# Patient Record
Sex: FEMALE | Race: WHITE | Employment: FULL TIME | URBAN - METROPOLITAN AREA
[De-identification: names, ages, dates, MRNs, and addresses within clinical notes are randomized per-mention and may not be internally consistent; named-entity substitution may affect disease eponyms.]

---

## 2017-01-05 ENCOUNTER — HOSPITAL ENCOUNTER (EMERGENCY)
Age: 22
Discharge: HOME | End: 2017-01-05
Attending: EMERGENCY MEDICINE
Payer: COMMERCIAL

## 2017-01-05 ENCOUNTER — APPOINTMENT (OUTPATIENT)
Dept: ULTRASOUND IMAGING | Age: 22
End: 2017-01-05
Attending: PHYSICIAN ASSISTANT
Payer: COMMERCIAL

## 2017-01-05 VITALS
SYSTOLIC BLOOD PRESSURE: 116 MMHG | TEMPERATURE: 97.6 F | OXYGEN SATURATION: 99 % | HEIGHT: 66 IN | WEIGHT: 140 LBS | DIASTOLIC BLOOD PRESSURE: 69 MMHG | HEART RATE: 84 BPM | RESPIRATION RATE: 16 BRPM | BODY MASS INDEX: 22.5 KG/M2

## 2017-01-05 DIAGNOSIS — N93.9 ABNORMAL UTERINE BLEEDING (AUB): Primary | ICD-10-CM

## 2017-01-05 LAB
ABO + RH BLD: NORMAL
ALBUMIN SERPL BCP-MCNC: 4.1 G/DL (ref 3.4–5)
ALBUMIN/GLOB SERPL: 1.2 {RATIO} (ref 0.8–1.7)
ALP SERPL-CCNC: 72 U/L (ref 45–117)
ALT SERPL-CCNC: 20 U/L (ref 13–56)
ANION GAP BLD CALC-SCNC: 4 MMOL/L (ref 3–18)
APPEARANCE UR: ABNORMAL
AST SERPL W P-5'-P-CCNC: 11 U/L (ref 15–37)
BACTERIA URNS QL MICRO: ABNORMAL /HPF
BASOPHILS # BLD AUTO: 0 K/UL (ref 0–0.1)
BASOPHILS # BLD: 0 % (ref 0–2)
BILIRUB SERPL-MCNC: 0.3 MG/DL (ref 0.2–1)
BILIRUB UR QL: NEGATIVE
BUN SERPL-MCNC: 14 MG/DL (ref 7–18)
BUN/CREAT SERPL: 19 (ref 12–20)
CALCIUM SERPL-MCNC: 8.5 MG/DL (ref 8.5–10.1)
CHLORIDE SERPL-SCNC: 106 MMOL/L (ref 100–108)
CO2 SERPL-SCNC: 30 MMOL/L (ref 21–32)
COLOR UR: ABNORMAL
CREAT SERPL-MCNC: 0.75 MG/DL (ref 0.6–1.3)
DIFFERENTIAL METHOD BLD: NORMAL
EOSINOPHIL # BLD: 0.4 K/UL (ref 0–0.4)
EOSINOPHIL NFR BLD: 4 % (ref 0–5)
EPITH CASTS URNS QL MICRO: ABNORMAL /LPF (ref 0–5)
ERYTHROCYTE [DISTWIDTH] IN BLOOD BY AUTOMATED COUNT: 12.7 % (ref 11.6–14.5)
GLOBULIN SER CALC-MCNC: 3.5 G/DL (ref 2–4)
GLUCOSE SERPL-MCNC: 83 MG/DL (ref 74–99)
GLUCOSE UR STRIP.AUTO-MCNC: NEGATIVE MG/DL
HCG SERPL-ACNC: 2 MIU/ML (ref 0–10)
HCT VFR BLD AUTO: 42.3 % (ref 35–45)
HGB BLD-MCNC: 14.2 G/DL (ref 12–16)
HGB UR QL STRIP: ABNORMAL
KETONES UR QL STRIP.AUTO: ABNORMAL MG/DL
LEUKOCYTE ESTERASE UR QL STRIP.AUTO: NEGATIVE
LYMPHOCYTES # BLD AUTO: 37 % (ref 21–52)
LYMPHOCYTES # BLD: 3.3 K/UL (ref 0.9–3.6)
MCH RBC QN AUTO: 28.1 PG (ref 24–34)
MCHC RBC AUTO-ENTMCNC: 33.6 G/DL (ref 31–37)
MCV RBC AUTO: 83.6 FL (ref 74–97)
MONOCYTES # BLD: 0.4 K/UL (ref 0.05–1.2)
MONOCYTES NFR BLD AUTO: 5 % (ref 3–10)
NEUTS SEG # BLD: 4.8 K/UL (ref 1.8–8)
NEUTS SEG NFR BLD AUTO: 54 % (ref 40–73)
NITRITE UR QL STRIP.AUTO: NEGATIVE
PH UR STRIP: 8.5 [PH] (ref 5–8)
PLATELET # BLD AUTO: 287 K/UL (ref 135–420)
PMV BLD AUTO: 9.9 FL (ref 9.2–11.8)
POTASSIUM SERPL-SCNC: 3.5 MMOL/L (ref 3.5–5.5)
PROT SERPL-MCNC: 7.6 G/DL (ref 6.4–8.2)
PROT UR STRIP-MCNC: 100 MG/DL
RBC # BLD AUTO: 5.06 M/UL (ref 4.2–5.3)
RBC #/AREA URNS HPF: ABNORMAL /HPF (ref 0–5)
SERVICE CMNT-IMP: NORMAL
SODIUM SERPL-SCNC: 140 MMOL/L (ref 136–145)
SP GR UR REFRACTOMETRY: 1.02 (ref 1–1.03)
TSH SERPL DL<=0.05 MIU/L-ACNC: 0.76 UIU/ML (ref 0.36–3.74)
UROBILINOGEN UR QL STRIP.AUTO: 0.2 EU/DL (ref 0.2–1)
WBC # BLD AUTO: 9 K/UL (ref 4.6–13.2)
WET PREP GENITAL: NORMAL

## 2017-01-05 PROCEDURE — 81001 URINALYSIS AUTO W/SCOPE: CPT | Performed by: PHYSICIAN ASSISTANT

## 2017-01-05 PROCEDURE — 87491 CHLMYD TRACH DNA AMP PROBE: CPT | Performed by: PHYSICIAN ASSISTANT

## 2017-01-05 PROCEDURE — 85025 COMPLETE CBC W/AUTO DIFF WBC: CPT | Performed by: PHYSICIAN ASSISTANT

## 2017-01-05 PROCEDURE — 80053 COMPREHEN METABOLIC PANEL: CPT | Performed by: PHYSICIAN ASSISTANT

## 2017-01-05 PROCEDURE — 84702 CHORIONIC GONADOTROPIN TEST: CPT | Performed by: PHYSICIAN ASSISTANT

## 2017-01-05 PROCEDURE — 99283 EMERGENCY DEPT VISIT LOW MDM: CPT

## 2017-01-05 PROCEDURE — 84443 ASSAY THYROID STIM HORMONE: CPT | Performed by: PHYSICIAN ASSISTANT

## 2017-01-05 PROCEDURE — 96374 THER/PROPH/DIAG INJ IV PUSH: CPT

## 2017-01-05 PROCEDURE — 86901 BLOOD TYPING SEROLOGIC RH(D): CPT | Performed by: PHYSICIAN ASSISTANT

## 2017-01-05 PROCEDURE — 74011250636 HC RX REV CODE- 250/636: Performed by: PHYSICIAN ASSISTANT

## 2017-01-05 PROCEDURE — 76830 TRANSVAGINAL US NON-OB: CPT

## 2017-01-05 PROCEDURE — 87210 SMEAR WET MOUNT SALINE/INK: CPT | Performed by: PHYSICIAN ASSISTANT

## 2017-01-05 RX ORDER — MORPHINE SULFATE 4 MG/ML
4 INJECTION, SOLUTION INTRAMUSCULAR; INTRAVENOUS
Status: COMPLETED | OUTPATIENT
Start: 2017-01-05 | End: 2017-01-05

## 2017-01-05 RX ORDER — HYDROCODONE BITARTRATE AND ACETAMINOPHEN 5; 325 MG/1; MG/1
1 TABLET ORAL
Qty: 12 TAB | Refills: 0 | Status: SHIPPED | OUTPATIENT
Start: 2017-01-05 | End: 2020-08-07

## 2017-01-05 RX ADMIN — Medication 4 MG: at 16:22

## 2017-01-05 NOTE — ED TRIAGE NOTES
Took the pill regimen for  x 2 months ago, heavy bleeding with clots x 1 week. States she feels tired all the time. Denies dizziness. states that huge clots came out with bad cramps.

## 2017-01-05 NOTE — ED PROVIDER NOTES
HPI Comments: Stefanie Stone is a 24 y.o. female who presents to the emergency department for evaluation of vaginal bleeding and pelvic cramping intermittent x 2 months since taking cytotec for elective  of 7-8 week gestation. She states she had heavy bleeding after the initial cytotec dose, then bleeding lightened, but did not stop completely. She states she had a normal follow-up visit in clinic for elective  with a normal US. Bleeding became very heavy yesterday and is now causing more pain. No nausea or vomiting. Pt denies possibility of recurrent pregnancy. She relates mild ACOSTA. Pt denies any fevers or chills, dizziness or light headedness, ENT issues, CP or discomfort, SOB, cough, d/c, back pain, diaphoresis, melena/hematochezia, dysuria, hematuria, frequency, focal weakness/numbness/tingling, or rash. Patient has no other complaints at this time. PCP: None        Patient is a 24 y.o. female presenting with  and vaginal bleeding. Elective    Pertinent negatives include no chest pain, no headaches and no shortness of breath. Vaginal Bleeding   Pertinent negatives include no chest pain, no headaches and no shortness of breath. Past Medical History:   Diagnosis Date    UTI (urinary tract infection)        No past surgical history on file. Family History:   Problem Relation Age of Onset    Cancer Other     Heart Disease Other     Diabetes Other        Social History     Social History    Marital status: SINGLE     Spouse name: N/A    Number of children: N/A    Years of education: N/A     Occupational History    Not on file.      Social History Main Topics    Smoking status: Current Every Day Smoker     Packs/day: 1.00    Smokeless tobacco: Not on file    Alcohol use No    Drug use: No    Sexual activity: Not on file     Other Topics Concern    Not on file     Social History Narrative         ALLERGIES: Review of patient's allergies indicates no known allergies. Review of Systems   Constitutional: Negative for chills and fever. HENT: Negative for congestion, rhinorrhea and sore throat. Respiratory: Negative for cough and shortness of breath. Cardiovascular: Negative for chest pain. Gastrointestinal: Negative for blood in stool, constipation, diarrhea, nausea and vomiting. Genitourinary: Positive for pelvic pain and vaginal bleeding. Negative for dysuria, frequency and hematuria. Musculoskeletal: Negative for back pain and myalgias. Skin: Negative for rash and wound. Neurological: Negative for dizziness, weakness, numbness and headaches. Vitals:    01/05/17 1305   BP: 116/69   Pulse: 84   Resp: 16   Temp: 97.6 °F (36.4 °C)   SpO2: 99%   Weight: 63.5 kg (140 lb)   Height: 5' 6\" (1.676 m)            Physical Exam   Constitutional: She is oriented to person, place, and time. She appears well-developed and well-nourished. No distress. HENT:   Head: Normocephalic and atraumatic. Nose: Nose normal.   Mouth/Throat: Oropharynx is clear and moist. No oropharyngeal exudate. Eyes: Conjunctivae and EOM are normal. Pupils are equal, round, and reactive to light. Neck: Normal range of motion. Neck supple. Cardiovascular: Normal rate and intact distal pulses. Exam reveals no gallop and no friction rub. No murmur heard. Pulmonary/Chest: Effort normal and breath sounds normal. No respiratory distress. She has no wheezes. She has no rales. She exhibits no tenderness. Abdominal: Soft. Bowel sounds are normal. She exhibits no distension and no mass. There is tenderness. There is no rebound and no guarding. Suprapubic TTP   Genitourinary:   Genitourinary Comments: Pelvic Exam:    Speculum and bimanual pelvic exam performed at bedside with chaperone assistance of DANIELLE lewis. Normal exam of external labia majora and minora without apparent rash or lesions. There was minimal vaginal bleeding. Cervical os was closed.   No adnexal masses or tenderness appreciated. Patient tolerated procedure well. No CMT. Samples obtained for GC/Chlamydia and wet prep, labeled at bedside and sent to lab.  1:52 PM     Neurological: She is alert and oriented to person, place, and time. Skin: Skin is warm and dry. No rash noted. She is not diaphoretic. Psychiatric: She has a normal mood and affect. Her behavior is normal.   Nursing note and vitals reviewed. MDM  Number of Diagnoses or Management Options  Abnormal uterine bleeding (AUB): new and requires workup  Diagnosis management comments: Differential Diagnosis:  Menses, anovulation/DUB, spontaneous , threatened , pregnancy, uterine leiomyomas, cervical or endometrial polyps, pelvic tumors, atrophic endometrium, systemic disorders, hypothyroidism, pelvic infections, exogenous hormone use, coagulopathy, trauma    Plan: Rh negative, but pt is not pregnant. Hcg qt of 2. Minimal bleeding on exam.  UA negative.  hgb 14.2. TVUS shows complex structure near uterine fundus - retained POC versus molar pregnancy. No leukocytosis. Discussed with OB, who recommends outpatient follow-up next week. No emergent meds here today. Lengthy discussion regarding need for follow-up. Discussed with Dr. Pavan Buckner the (+/-) of rhogam at this point. Do not feel it is necessary given that patient is 2 months out from  with hcg of 2 today. At this time, patient is stable and appropriate for discharge home. Patient demonstrates understanding of current diagnoses and is in agreement with the treatment plan. They are advised that while the likelihood of serious underlying condition is low at this point given the evaluation performed today, we cannot fully rule it out. They are advised to immediately return with any new symptoms or worsening of current condition. All questions have been answered.   Patient is given educational material regarding their diagnoses, including danger symptoms and when to return to the ED. Follow-up with Dr. Conchis Mendoza. Amount and/or Complexity of Data Reviewed  Clinical lab tests: ordered and reviewed  Tests in the radiology section of CPT®: ordered and reviewed  Review and summarize past medical records: yes  Discuss the patient with other providers: yes (Discussed case with Dr. Emily Mchugh)    Risk of Complications, Morbidity, and/or Mortality  Presenting problems: moderate  Diagnostic procedures: high  Management options: moderate    Patient Progress  Patient progress: improved    ED Course       Procedures           -------------------------------------------------------------------------------------------------------------------  Orders:  Orders Placed This Encounter    WET PREP     Standing Status:   Standing     Number of Occurrences:   1    US TRANSVAGINAL     Standing Status:   Standing     Number of Occurrences:   1     Order Specific Question:   Transport     Answer:   Wheelchair [7]     Order Specific Question:   Reason for Exam     Answer:   heavy vaginal bleeding     Order Specific Question:   Is Patient Pregnant?      Answer:   Unknown    URINALYSIS W/ RFLX MICROSCOPIC     Standing Status:   Standing     Number of Occurrences:   1    CBC WITH AUTOMATED DIFF     Standing Status:   Standing     Number of Occurrences:   1    METABOLIC PANEL, COMPREHENSIVE     Standing Status:   Standing     Number of Occurrences:   1    TOTAL HCG, QT.     Standing Status:   Standing     Number of Occurrences:   1    CHLAMYDIA/NEISSERIA AMPLIFICATION     Standing Status:   Standing     Number of Occurrences:   1     Order Specific Question:   Specimen type/source     Answer:   Endocervical [538]    TSH, 3RD GENERATION     Standing Status:   Standing     Number of Occurrences:   1    URINE MICROSCOPIC ONLY     Standing Status:   Standing     Number of Occurrences:   1    TYPE, ABO & RH     Standing Status:   Standing     Number of Occurrences:   1    morphine injection 4 mg    HYDROcodone-acetaminophen (NORCO) 5-325 mg per tablet     Sig: Take 1 Tab by mouth every four (4) hours as needed for Pain. Max Daily Amount: 6 Tabs. Dispense:  12 Tab     Refill:  0     Lab Results:   Recent Results (from the past 12 hour(s))   URINALYSIS W/ RFLX MICROSCOPIC    Collection Time: 01/05/17  1:30 PM   Result Value Ref Range    Color RED      Appearance CLOUDY      Specific gravity 1.020 1.003 - 1.030      pH (UA) 8.5 (H) 5.0 - 8.0      Protein 100 (A) NEG mg/dL    Glucose NEGATIVE  NEG mg/dL    Ketone TRACE (A) NEG mg/dL    Bilirubin NEGATIVE  NEG      Blood LARGE (A) NEG      Urobilinogen 0.2 0.2 - 1.0 EU/dL    Nitrites NEGATIVE  NEG      Leukocyte Esterase NEGATIVE  NEG     URINE MICROSCOPIC ONLY    Collection Time: 01/05/17  1:30 PM   Result Value Ref Range    RBC TOO NUMEROUS TO COUNT 0 - 5 /hpf    Epithelial cells 1+ 0 - 5 /lpf    Bacteria 1+ (A) NEG /hpf   CBC WITH AUTOMATED DIFF    Collection Time: 01/05/17  1:36 PM   Result Value Ref Range    WBC 9.0 4.6 - 13.2 K/uL    RBC 5.06 4.20 - 5.30 M/uL    HGB 14.2 12.0 - 16.0 g/dL    HCT 42.3 35.0 - 45.0 %    MCV 83.6 74.0 - 97.0 FL    MCH 28.1 24.0 - 34.0 PG    MCHC 33.6 31.0 - 37.0 g/dL    RDW 12.7 11.6 - 14.5 %    PLATELET 634 925 - 786 K/uL    MPV 9.9 9.2 - 11.8 FL    NEUTROPHILS 54 40 - 73 %    LYMPHOCYTES 37 21 - 52 %    MONOCYTES 5 3 - 10 %    EOSINOPHILS 4 0 - 5 %    BASOPHILS 0 0 - 2 %    ABS. NEUTROPHILS 4.8 1.8 - 8.0 K/UL    ABS. LYMPHOCYTES 3.3 0.9 - 3.6 K/UL    ABS. MONOCYTES 0.4 0.05 - 1.2 K/UL    ABS. EOSINOPHILS 0.4 0.0 - 0.4 K/UL    ABS.  BASOPHILS 0.0 0.0 - 0.1 K/UL    DF AUTOMATED     METABOLIC PANEL, COMPREHENSIVE    Collection Time: 01/05/17  1:36 PM   Result Value Ref Range    Sodium 140 136 - 145 mmol/L    Potassium 3.5 3.5 - 5.5 mmol/L    Chloride 106 100 - 108 mmol/L    CO2 30 21 - 32 mmol/L    Anion gap 4 3.0 - 18 mmol/L    Glucose 83 74 - 99 mg/dL    BUN 14 7.0 - 18 MG/DL    Creatinine 0.75 0.6 - 1.3 MG/DL BUN/Creatinine ratio 19 12 - 20      GFR est AA >60 >60 ml/min/1.73m2    GFR est non-AA >60 >60 ml/min/1.73m2    Calcium 8.5 8.5 - 10.1 MG/DL    Bilirubin, total 0.3 0.2 - 1.0 MG/DL    ALT 20 13 - 56 U/L    AST 11 (L) 15 - 37 U/L    Alk. phosphatase 72 45 - 117 U/L    Protein, total 7.6 6.4 - 8.2 g/dL    Albumin 4.1 3.4 - 5.0 g/dL    Globulin 3.5 2.0 - 4.0 g/dL    A-G Ratio 1.2 0.8 - 1.7     TOTAL HCG, QT. Collection Time: 01/05/17  1:36 PM   Result Value Ref Range    HCG, Qt. 2 0 - 10 MIU/ML   TSH, 3RD GENERATION    Collection Time: 01/05/17  1:36 PM   Result Value Ref Range    TSH 0.76 0.36 - 3.74 uIU/mL   WET PREP    Collection Time: 01/05/17  2:15 PM   Result Value Ref Range    Special Requests: NO SPECIAL REQUESTS      Wet prep NO YEAST,TRICHOMONAS OR CLUE CELLS NOTED     TYPE, ABO & RH    Collection Time: 01/05/17  2:23 PM   Result Value Ref Range    ABO/Rh(D) A NEGATIVE      Radiology Results:  US TRANSVAGINAL   Final Result   IMPRESSION:      Abnormal complex mixed echogenicity structure in the endometrial cavity near the  fundus region. Differential considerations of retained product of conception  vs. less likely molar pregnancy.      - In light of markedly low serum beta-hCG, molar pregnancy is felt to be  unlikely. Progress Notes:  1:52 PM:  Brit Richardson PA-C was at the pt's bedside, assessed the pt and answered the pt's questions regarding treatment. [id number] PM:  Discussed case with Dr. Teresa Hernandez, OB. Pt is acceptable for outpatient follow-up in office next week. No medication recommendations at this time. Hgb 14.2. Brit Richardson PA-C    -------------------------------------------------------------------------------------------------------------------    Disposition:  Diagnosis:   1.  Abnormal uterine bleeding (AUB)        Disposition: [place] Home    Follow-up Information     Follow up With Details Comments Contact Sofia Rodriguez MD Call in 1 day to schedule follow-up appointment for next week [address] [facility] EM[facility]GENCY DEPT Go to As needed, If symptoms worsen [address] [facility] [address]  [phone]          Patient's Medications   Start Taking    HYDROCODONE-ACETAMINOPHEN (NORCO) 5-325 MG PER TABLET    Take 1 Tab by mouth every four (4) hours as needed for Pain. Max Daily Amount: 6 Tabs.    Continue Taking    No medications on file   These Medications have changed    No medications on file   Stop Taking    No medications on file

## 2017-01-05 NOTE — DISCHARGE INSTRUCTIONS
Abnormal Uterine Bleeding: Care Instructions  Your Care Instructions  Abnormal uterine bleeding (AUB) is irregular bleeding from the uterus that is longer or heavier than usual or does not occur at your regular time. Sometimes it is caused by changes in hormone levels. It can also be caused by growths in the uterus, such as fibroids or polyps. Sometimes a cause cannot be found. You may have heavy bleeding when you are not expecting your period. Your doctor may suggest a pregnancy test, if you think you are pregnant. Follow-up care is a key part of your treatment and safety. Be sure to make and go to all appointments, and call your doctor if you are having problems. It's also a good idea to know your test results and keep a list of the medicines you take. How can you care for yourself at home? · Be safe with medicines. Take pain medicines exactly as directed. ¨ If the doctor gave you a prescription medicine for pain, take it as prescribed. ¨ If you are not taking a prescription pain medicine, ask your doctor if you can take an over-the-counter medicine. · You may be low in iron because of blood loss. Eat a balanced diet that is high in iron and vitamin C. Foods rich in iron include red meat, shellfish, eggs, beans, and leafy green vegetables. Talk to your doctor about whether you need to take iron pills or a multivitamin. When should you call for help? Call 911 anytime you think you may need emergency care. For example, call if:  · You passed out (lost consciousness). Call your doctor now or seek immediate medical care if:  · You have sudden, severe pain in your belly or pelvis. · You have severe vaginal bleeding. You are soaking through your usual pads or tampons every hour for 2 or more hours. · You feel dizzy or lightheaded, or you feel like you may faint. Watch closely for changes in your health, and be sure to contact your doctor if:  · You have new belly or pelvic pain.   · You have a fever.  · Your bleeding gets worse or lasts longer than 1 week. · You think you may be pregnant. Where can you learn more? Go to http://jackie-zenobia.info/. Enter A645 in the search box to learn more about \"Abnormal Uterine Bleeding: Care Instructions. \"  Current as of: February 25, 2016  Content Version: 11.1  © 5451-6129 [employer]. Care instructions adapted under license by [employer] (which disclaims liability or warranty for this information). If you have questions about a medical condition or this instruction, always ask your healthcare professional. [place] [address] any warranty or liability for your use of this information.

## 2017-01-06 LAB
C TRACH RRNA SPEC QL NAA+PROBE: NEGATIVE
N GONORRHOEA RRNA SPEC QL NAA+PROBE: NEGATIVE
SPECIMEN SOURCE: NORMAL

## 2017-01-10 ENCOUNTER — OFFICE VISIT (OUTPATIENT)
Dept: OBGYN CLINIC | Age: 22
End: 2017-01-10

## 2017-01-10 VITALS
DIASTOLIC BLOOD PRESSURE: 76 MMHG | HEIGHT: 66 IN | HEART RATE: 78 BPM | WEIGHT: 140 LBS | BODY MASS INDEX: 22.5 KG/M2 | SYSTOLIC BLOOD PRESSURE: 119 MMHG

## 2017-01-10 DIAGNOSIS — N94.6 DYSMENORRHEA: ICD-10-CM

## 2017-01-10 DIAGNOSIS — N93.9 ABNORMAL UTERINE BLEEDING: Primary | ICD-10-CM

## 2017-01-10 RX ORDER — IBUPROFEN 800 MG/1
800 TABLET ORAL
Qty: 20 TAB | Refills: 0 | Status: ON HOLD | OUTPATIENT
Start: 2017-01-10 | End: 2020-08-07 | Stop reason: SDUPTHER

## 2017-01-10 RX ORDER — NORGESTIMATE AND ETHINYL ESTRADIOL 0.25-0.035
1 KIT ORAL DAILY
Qty: 1 PACKAGE | Refills: 0 | Status: SHIPPED | OUTPATIENT
Start: 2017-01-10 | End: 2020-08-07

## 2017-01-10 NOTE — PROGRESS NOTES
SUBJ: Ms. Alysa Mei is a 24 y.o. y/o female, , who presents today for     Chief Complaint   Patient presents with    ED Follow-up       Her LMP was No LMP recorded. Nonda Tommy c/o persistent vaginal bleeding following a TAB with cytotec 2 months ago. Following the procedure, she experienced heavy bleeding for the first few days which then lightened. Since that time, she has had intermittent VB that varies from light spotting to heavy bleeding with clots. She states that she began to bleed heavy with clots approx 2 weeks ago for which she sought care at the ED. She was seen in the ED on 17 for continued vaginal bleeding. TV US showed complex hypoechogenic structure in uterine fundus, but neg for fibroids or ovarian cysts. Possible suspicion for molar pregnancy or retained POC; however, Total HCG was 2, all STI testing negative. She continues to have heavy bleeding with intermittent clots today with cramping that leaks through her clothing. Pain scale is 4/10. She was given [facility] for pain by the ED; however, she has completed these tabs. No other  complaints of vaginitis or reported fever/chills. Past Medical History   Diagnosis Date    UTI (urinary tract infection)       No past surgical history on file.   Family History   Problem Relation Age of Onset    Cancer Other     Heart Disease Other     Diabetes Other      Social History     Social History    Marital status: SINGLE     Spouse name: N/A    Number of children: N/A    Years of education: N/A     Social History Main Topics    Smoking status: Current Every Day Smoker     Packs/day: 1.00    Smokeless tobacco: None    Alcohol use No    Drug use: No    Sexual activity: Not Asked     Other Topics Concern    None     Social History Narrative         PE:   Visit Vitals    /76 (BP 1 Location: Right arm, BP Patient Position: Sitting)    Pulse 78    Ht 5' 6\" (1.676 m)    Wt 140 lb (63.5 kg)    BMI 22.6 kg/m2       Pt is a well developed female who is alert and oriented x 3. CVS exam: normal rate, regular rhythm, normal S1, S2, no murmurs, rubs, clicks or gallops. Lungs: clear bilaterally to auscultation, no wheezing, rhonci or rales  Pelvic exam: VULVA: normal appearing vulva with no masses, tenderness or lesions, VAGINA: heavy vaginal bleeding, CERVIX: normal appearing cervix without discharge or lesions, cervical motion tenderness absent, UTERUS: uterus is normal size, shape, consistency and nontender, ADNEXA: normal adnexa in size, nontender and no masses. Assessment/Plan:       1. Abnormal uterine bleeding    - US TRANSVAGINAL; Future  - norgestimate-ethinyl estradiol ([address], ,) 0.25-35 mg-mcg tab; Take 2 tabs by mouth on day 1, 2 tabs by mouth on day 2 and 1 tab by mouth for the remaining pill pack. Dispense: 1 Package; Refill: 0    2. Dysmenorrhea    - ibuprofen (MOTRIN) 800 mg tablet; Take 1 Tab by mouth every six (6) hours as needed for Pain. Indications: DYSMENORRHEA  Dispense: 20 Tab; Refill: 0    Discussed this plan with Dr. Sonia Banerjee who agrees. RTC in 1 wk for f/u. She verbalizes understanding.

## 2017-01-10 NOTE — PATIENT INSTRUCTIONS
Abnormal Uterine Bleeding: Care Instructions  Your Care Instructions  Abnormal uterine bleeding (AUB) is irregular bleeding from the uterus that is longer or heavier than usual or does not occur at your regular time. Sometimes it is caused by changes in hormone levels. It can also be caused by growths in the uterus, such as fibroids or polyps. Sometimes a cause cannot be found. You may have heavy bleeding when you are not expecting your period. Your doctor may suggest a pregnancy test, if you think you are pregnant. Follow-up care is a key part of your treatment and safety. Be sure to make and go to all appointments, and call your doctor if you are having problems. It's also a good idea to know your test results and keep a list of the medicines you take. How can you care for yourself at home? · Be safe with medicines. Take pain medicines exactly as directed. ¨ If the doctor gave you a prescription medicine for pain, take it as prescribed. ¨ If you are not taking a prescription pain medicine, ask your doctor if you can take an over-the-counter medicine. · You may be low in iron because of blood loss. Eat a balanced diet that is high in iron and vitamin C. Foods rich in iron include red meat, shellfish, eggs, beans, and leafy green vegetables. Talk to your doctor about whether you need to take iron pills or a multivitamin. When should you call for help? Call 911 anytime you think you may need emergency care. For example, call if:  · You passed out (lost consciousness). Call your doctor now or seek immediate medical care if:  · You have sudden, severe pain in your belly or pelvis. · You have severe vaginal bleeding. You are soaking through your usual pads or tampons every hour for 2 or more hours. · You feel dizzy or lightheaded, or you feel like you may faint. Watch closely for changes in your health, and be sure to contact your doctor if:  · You have new belly or pelvic pain.   · You have a fever.  · Your bleeding gets worse or lasts longer than 1 week. · You think you may be pregnant. Where can you learn more? Go to http://jackie-zenobia.info/. Enter T137 in the search box to learn more about \"Abnormal Uterine Bleeding: Care Instructions. \"  Current as of: February 25, 2016  Content Version: 11.1  © 9719-5497 [employer]. Care instructions adapted under license by [employer] (which disclaims liability or warranty for this information). If you have questions about a medical condition or this instruction, always ask your healthcare professional. [place] [address] any warranty or liability for your use of this information.

## 2020-08-05 PROBLEM — Z37.9 NORMAL LABOR: Status: ACTIVE | Noted: 2020-08-05
